# Patient Record
Sex: FEMALE | Race: WHITE | Employment: UNEMPLOYED | URBAN - METROPOLITAN AREA
[De-identification: names, ages, dates, MRNs, and addresses within clinical notes are randomized per-mention and may not be internally consistent; named-entity substitution may affect disease eponyms.]

---

## 2017-11-07 ENCOUNTER — OFFICE VISIT (OUTPATIENT)
Dept: FAMILY MEDICINE CLINIC | Age: 22
End: 2017-11-07

## 2017-11-07 VITALS
HEIGHT: 65 IN | OXYGEN SATURATION: 98 % | RESPIRATION RATE: 16 BRPM | DIASTOLIC BLOOD PRESSURE: 58 MMHG | BODY MASS INDEX: 18.63 KG/M2 | TEMPERATURE: 98.2 F | SYSTOLIC BLOOD PRESSURE: 95 MMHG | HEART RATE: 78 BPM | WEIGHT: 111.8 LBS

## 2017-11-07 DIAGNOSIS — Z00.00 WELL ADULT EXAM: Primary | ICD-10-CM

## 2017-11-07 DIAGNOSIS — Z11.1 ENCOUNTER FOR PPD TEST: ICD-10-CM

## 2017-11-07 DIAGNOSIS — Z23 ENCOUNTER FOR IMMUNIZATION: ICD-10-CM

## 2017-11-07 NOTE — PATIENT INSTRUCTIONS
Well Visit, Ages 25 to 48: Care Instructions  Your Care Instructions    Physical exams can help you stay healthy. Your doctor has checked your overall health and may have suggested ways to take good care of yourself. He or she also may have recommended tests. At home, you can help prevent illness with healthy eating, regular exercise, and other steps. Follow-up care is a key part of your treatment and safety. Be sure to make and go to all appointments, and call your doctor if you are having problems. It's also a good idea to know your test results and keep a list of the medicines you take. How can you care for yourself at home? · Reach and stay at a healthy weight. This will lower your risk for many problems, such as obesity, diabetes, heart disease, and high blood pressure. · Get at least 30 minutes of physical activity on most days of the week. Walking is a good choice. You also may want to do other activities, such as running, swimming, cycling, or playing tennis or team sports. Discuss any changes in your exercise program with your doctor. · Do not smoke or allow others to smoke around you. If you need help quitting, talk to your doctor about stop-smoking programs and medicines. These can increase your chances of quitting for good. · Talk to your doctor about whether you have any risk factors for sexually transmitted infections (STIs). Having one sex partner (who does not have STIs and does not have sex with anyone else) is a good way to avoid these infections. · Use birth control if you do not want to have children at this time. Talk with your doctor about the choices available and what might be best for you. · Protect your skin from too much sun. When you're outdoors from 10 a.m. to 4 p.m., stay in the shade or cover up with clothing and a hat with a wide brim. Wear sunglasses that block UV rays. Even when it's cloudy, put broad-spectrum sunscreen (SPF 30 or higher) on any exposed skin.   · See a dentist one or two times a year for checkups and to have your teeth cleaned. · Wear a seat belt in the car. · Drink alcohol in moderation, if at all. That means no more than 2 drinks a day for men and 1 drink a day for women. Follow your doctor's advice about when to have certain tests. These tests can spot problems early. For everyone  · Cholesterol. Have the fat (cholesterol) in your blood tested after age 21. Your doctor will tell you how often to have this done based on your age, family history, or other things that can increase your risk for heart disease. · Blood pressure. Have your blood pressure checked during a routine doctor visit. Your doctor will tell you how often to check your blood pressure based on your age, your blood pressure results, and other factors. · Vision. Talk with your doctor about how often to have a glaucoma test.  · Diabetes. Ask your doctor whether you should have tests for diabetes. · Colon cancer. Have a test for colon cancer at age 48. You may have one of several tests. If you are younger than 48, you may need a test earlier if you have any risk factors. Risk factors include whether you already had a precancerous polyp removed from your colon or whether your parent, brother, sister, or child has had colon cancer. For women  · Breast exam and mammogram. Talk to your doctor about when you should have a clinical breast exam and a mammogram. Medical experts differ on whether and how often women under 50 should have these tests. Your doctor can help you decide what is right for you. · Pap test and pelvic exam. Begin Pap tests at age 24. A Pap test is the best way to find cervical cancer. The test often is part of a pelvic exam. Ask how often to have this test.  · Tests for sexually transmitted infections (STIs). Ask whether you should have tests for STIs. You may be at risk if you have sex with more than one person, especially if your partners do not wear condoms.   For men  · Tests for sexually transmitted infections (STIs). Ask whether you should have tests for STIs. You may be at risk if you have sex with more than one person, especially if you do not wear a condom. · Testicular cancer exam. Ask your doctor whether you should check your testicles regularly. · Prostate exam. Talk to your doctor about whether you should have a blood test (called a PSA test) for prostate cancer. Experts differ on whether and when men should have this test. Some experts suggest it if you are older than 39 and are -American or have a father or brother who got prostate cancer when he was younger than 72. When should you call for help? Watch closely for changes in your health, and be sure to contact your doctor if you have any problems or symptoms that concern you. Where can you learn more? Go to http://neville-luis.info/. Enter P072 in the search box to learn more about \"Well Visit, Ages 25 to 48: Care Instructions. \"  Current as of: May 12, 2017  Content Version: 11.4  © 5151-2467 [email or web address]. Care instructions adapted under license by [employer] (which disclaims liability or warranty for this information). If you have questions about a medical condition or this instruction, always ask your healthcare professional. [address] any warranty or liability for your use of this information. Influenza (Flu) Vaccine (Inactivated or Recombinant): What You Need to Know  Why get vaccinated? Influenza (\"flu\") is a contagious disease that spreads around the [place] every winter, usually between October and May. Flu is caused by influenza viruses and is spread mainly by coughing, sneezing, and close contact. Anyone can get flu. Flu strikes suddenly and can last several days. Symptoms vary by age, but can include:  · Fever/chills. · Sore throat. · Muscle aches. · Fatigue. · Cough. · Headache.   · Runny or stuffy nose.  Flu can also lead to pneumonia and blood infections, and cause diarrhea and seizures in children. If you have a medical condition, such as heart or lung disease, flu can make it worse. Flu is more dangerous for some people. Infants and young children, people 72years of age and older, pregnant women, and people with certain health conditions or a weakened immune system are at greatest risk. Each year thousands of people in the [facility] die from flu, and many more are hospitalized. Flu vaccine can:  · Keep you from getting flu. · Make flu less severe if you do get it. · Keep you from spreading flu to your family and other people. Inactivated and recombinant flu vaccines  A dose of flu vaccine is recommended every flu season. Children 6 months through 6years of age may need two doses during the same flu season. Everyone else needs only one dose each flu season. Some inactivated flu vaccines contain a very small amount of a mercury-based preservative called thimerosal. Studies have not shown thimerosal in vaccines to be harmful, but flu vaccines that do not contain thimerosal are available. There is no live flu virus in flu shots. They cannot cause the flu. There are many flu viruses, and they are always changing. Each year a new flu vaccine is made to protect against three or four viruses that are likely to cause disease in the upcoming flu season. But even when the vaccine doesn't exactly match these viruses, it may still provide some protection. Flu vaccine cannot prevent:  · Flu that is caused by a virus not covered by the vaccine. · Illnesses that look like flu but are not. Some people should not get this vaccine  Tell the person who is giving you the vaccine:  · If you have any severe (life-threatening) allergies. If you ever had a life-threatening allergic reaction after a dose of flu vaccine, or have a severe allergy to any part of this vaccine, you may be advised not to get vaccinated. Most, but not all, types of flu vaccine contain a small amount of egg protein. · If you ever had Guillain-Barré syndrome (also called GBS) Some people with a history of GBS should not get this vaccine. This should be discussed with your doctor. · If you are not feeling well. It is usually okay to get flu vaccine when you have a mild illness, but you might be asked to come back when you feel better. Risks of a vaccine reaction  With any medicine, including vaccines, there is a chance of reactions. These are usually mild and go away on their own, but serious reactions are also possible. Most people who get a flu shot do not have any problems with it. Minor problems following a flu shot include:  · Soreness, redness, or swelling where the shot was given  · Hoarseness  · Sore, red or itchy eyes  · Cough  · Fever  · Aches  · Headache  · Itching  · Fatigue  If these problems occur, they usually begin soon after the shot and last 1 or 2 days. More serious problems following a flu shot can include the following:  · There may be a small increased risk of Guillain-Barré Syndrome (GBS) after inactivated flu vaccine. This risk has been estimated at 1 or 2 additional cases per million people vaccinated. This is much lower than the risk of severe complications from flu, which can be prevented by flu vaccine. · Delia Trejo children who get the flu shot along with pneumococcal vaccine (PCV13) and/or DTaP vaccine at the same time might be slightly more likely to have a seizure caused by fever. Ask your doctor for more information. Tell your doctor if a child who is getting flu vaccine has ever had a seizure  Problems that could happen after any injected vaccine:  · People sometimes faint after a medical procedure, including vaccination. Sitting or lying down for about 15 minutes can help prevent fainting, and injuries caused by a fall. Tell your doctor if you feel dizzy, or have vision changes or ringing in the ears.   · Some people get severe pain in the shoulder and have difficulty moving the arm where a shot was given. This happens very rarely. · Any medication can cause a severe allergic reaction. Such reactions from a vaccine are very rare, estimated at about 1 in a million doses, and would happen within a few minutes to a few hours after the vaccination. As with any medicine, there is a very remote chance of a vaccine causing a serious injury or death. The safety of vaccines is always being monitored. For more information, visit: www.cdc.gov/vaccinesafety/. What if there is a serious reaction? What should I look for? · Look for anything that concerns you, such as signs of a severe allergic reaction, very high fever, or unusual behavior. Signs of a severe allergic reaction can include hives, swelling of the face and throat, difficulty breathing, a fast heartbeat, dizziness, and weakness - usually within a few minutes to a few hours after the vaccination. What should I do? · If you think it is a severe allergic reaction or other emergency that can't wait, call 9-1-1 and get the person to the nearest hospital. Otherwise, call your doctor. · Reactions should be reported to the \"Vaccine Adverse Event Reporting System\" (VAERS). Your doctor should file this report, or you can do it yourself through the VAERS website at www.vaers. [facility].gov, or by calling 7-370.681.1236. VAERS does not give medical advice. The National Vaccine Injury Compensation Program  The National Vaccine Injury Compensation Program (VICP) is a federal program that was created to compensate people who may have been injured by certain vaccines. Persons who believe they may have been injured by a vaccine can learn about the program and about filing a claim by calling 0-113.188.9677 or visiting the [employer] website at www.[facility].gov/vaccinecompensation. There is a time limit to file a claim for compensation. How can I learn more? · Ask your healthcare provider.  He or she can give you the vaccine package insert or suggest other sources of information. · Call your local or state health department. · Contact the Centers for Disease Control and Prevention (CDC):  ¨ Call 8-697.762.2023 (1-800-CDC-INFO) or  ¨ Visit CDC's website at www.cdc.gov/flu  Vaccine Information Statement  Inactivated Influenza Vaccine  8/7/2015)  42 JAIMIE Serna [id number]-[id number]  Department of Health and Human Services  Centers for Disease Control and Prevention  Many Vaccine Information Statements are available in Vietnamese and other languages. See www.immunize.org/vis. Muchas hojas de información sobre vacunas están disponibles en español y en otros idiomas. Visite www.immunize.org/vis. Care instructions adapted under license by [employer] (which disclaims liability or warranty for this information). If you have questions about a medical condition or this instruction, always ask your healthcare professional. [address] any warranty or liability for your use of this information.

## 2017-11-07 NOTE — MR AVS SNAPSHOT
Visit Information Date & Time Provider Department Dept. Phone Encounter #  
 11/7/2017 11:00 AM Shanthi Zavala  [facility] [phone] [id number] Follow-up Instructions Return in about 2 days (around 11/9/2017) for ppd reading. Allergies as of 11/7/2017  Review Complete On: 11/7/2017 By: Shanthi Zavala NP No Known Allergies Current Immunizations  Reviewed on 11/7/2017 Name Date HPV 11/20/2008, 7/15/2008, 4/30/2008 Hep A Vaccine 2/5/2014, 2/7/2012 Hep B Vaccine 1997, 1997, 1997 Hib 6/11/1998, 1997, 1997, 1997 Influenza Vaccine 2/7/2012 MMR 2/20/2016  1:47 PM, 1/24/2002, 3/12/1998 Meningococcal Vaccine 2/5/2014, 4/30/2008 Poliovirus vaccine 1/24/2002, 6/11/1998, 1997, 1997 TB Skin Test (PPD) Intradermal  Incomplete Tdap 2/20/2016  2:36 PM, 4/30/2008 Varicella Virus Vaccine 4/30/2008, 3/12/1998 Reviewed by Shikha Marcelo on 11/7/2017 at 11:17 AM  
You Were Diagnosed With   
  
 Codes Comments Well adult exam    -  Primary ICD-10-CM: Z00.00 ICD-9-CM: V70.0 Encounter for PPD test     ICD-10-CM: Z11.1 ICD-9-CM: V74.1 Vitals BP Pulse Temp Resp Height(growth percentile) Weight(growth percentile) 95/58 (BP 1 Location: Left arm, BP Patient Position: Sitting) 78 98.2 °F (36.8 °C) (Oral) 16 5' 4.5\" (1.638 m) 111 lb 12.8 oz (50.7 kg) LMP SpO2 Breastfeeding? BMI OB Status Smoking Status 11/06/2017 (Exact Date) 98% No 18.89 kg/m2 Having regular periods Never Smoker Vitals History BMI and BSA Data Body Mass Index Body Surface Area  
 18.89 kg/m 2 1.52 m 2 Preferred Pharmacy Pharmacy Name Phone [phone] [address] [phone] Your Updated Medication List  
  
Notice  As of 11/7/2017 11:27 AM  
 You have not been prescribed any medications. We Performed the Following AMB POC TUBERCULOSIS, INTRADERMAL (SKIN TEST) [07456 CPT(R)] Follow-up Instructions Return in about 2 days (around 11/9/2017) for ppd reading. Patient Instructions Well Visit, Ages 25 to 48: Care Instructions Your Care Instructions Physical exams can help you stay healthy. Your doctor has checked your overall health and may have suggested ways to take good care of yourself. He or she also may have recommended tests. At home, you can help prevent illness with healthy eating, regular exercise, and other steps. Follow-up care is a key part of your treatment and safety. Be sure to make and go to all appointments, and call your doctor if you are having problems. It's also a good idea to know your test results and keep a list of the medicines you take. How can you care for yourself at home? · Reach and stay at a healthy weight. This will lower your risk for many problems, such as obesity, diabetes, heart disease, and high blood pressure. · Get at least 30 minutes of physical activity on most days of the week. Walking is a good choice. You also may want to do other activities, such as running, swimming, cycling, or playing tennis or team sports. Discuss any changes in your exercise program with your doctor. · Do not smoke or allow others to smoke around you. If you need help quitting, talk to your doctor about stop-smoking programs and medicines. These can increase your chances of quitting for good. · Talk to your doctor about whether you have any risk factors for sexually transmitted infections (STIs). Having one sex partner (who does not have STIs and does not have sex with anyone else) is a good way to avoid these infections. · Use birth control if you do not want to have children at this time. Talk with your doctor about the choices available and what might be best for you. · Protect your skin from too much sun. When you're outdoors from 10 a.m. to 4 p.m., stay in the shade or cover up with clothing and a hat with a wide brim. Wear sunglasses that block UV rays. Even when it's cloudy, put broad-spectrum sunscreen (SPF 30 or higher) on any exposed skin. · See a dentist one or two times a year for checkups and to have your teeth cleaned. · Wear a seat belt in the car. · Drink alcohol in moderation, if at all. That means no more than 2 drinks a day for men and 1 drink a day for women. Follow your doctor's advice about when to have certain tests. These tests can spot problems early. For everyone · Cholesterol. Have the fat (cholesterol) in your blood tested after age 21. Your doctor will tell you how often to have this done based on your age, family history, or other things that can increase your risk for heart disease. · Blood pressure. Have your blood pressure checked during a routine doctor visit. Your doctor will tell you how often to check your blood pressure based on your age, your blood pressure results, and other factors. · Vision. Talk with your doctor about how often to have a glaucoma test. 
· Diabetes. Ask your doctor whether you should have tests for diabetes. · Colon cancer. Have a test for colon cancer at age 48. You may have one of several tests. If you are younger than 48, you may need a test earlier if you have any risk factors. Risk factors include whether you already had a precancerous polyp removed from your colon or whether your parent, brother, sister, or child has had colon cancer. For women · Breast exam and mammogram. Talk to your doctor about when you should have a clinical breast exam and a mammogram. Medical experts differ on whether and how often women under 50 should have these tests. Your doctor can help you decide what is right for you. · Pap test and pelvic exam. Begin Pap tests at age 24. A Pap test is the best way to find cervical cancer.  The test often is part of a pelvic exam. Ask how often to have this test. 
 · Tests for sexually transmitted infections (STIs). Ask whether you should have tests for STIs. You may be at risk if you have sex with more than one person, especially if your partners do not wear condoms. For men · Tests for sexually transmitted infections (STIs). Ask whether you should have tests for STIs. You may be at risk if you have sex with more than one person, especially if you do not wear a condom. · Testicular cancer exam. Ask your doctor whether you should check your testicles regularly. · Prostate exam. Talk to your doctor about whether you should have a blood test (called a PSA test) for prostate cancer. Experts differ on whether and when men should have this test. Some experts suggest it if you are older than 39 and are -American or have a father or brother who got prostate cancer when he was younger than 72. When should you call for help? Watch closely for changes in your health, and be sure to contact your doctor if you have any problems or symptoms that concern you. Where can you learn more? Go to http://neville-luis.info/. Enter P072 in the search box to learn more about \"Well Visit, Ages 25 to 48: Care Instructions. \" Current as of: May 12, 2017 Content Version: 11.4 © 6164-6634 [employer]. Care instructions adapted under license by [employer] (which disclaims liability or warranty for this information). If you have questions about a medical condition or this instruction, always ask your healthcare professional. [place] [address] any warranty or liability for your use of this information. Introducing [facility]! [facility] introduces [employer] [facility] patient portal. Now you can access parts of your medical record, email your doctor's office, and request medication refills online. 1. In your internet browser, go to [email or web address] 2. Click on the First Time User? Click Here link in the Sign In box. You will see the New Member Sign Up page. 3. Enter your [employer] Access Code exactly as it appears below. You will not need to use this code after youve completed the sign-up process. If you do not sign up before the expiration date, you must request a new code. · [employer] Access Code: [email or web address] Expires: 2/5/2018 11:27 AM 
 
4. Enter the last four digits of your Social Security Number (xxxx) and Date of Birth (mm/dd/yyyy) as indicated and click Submit. You will be taken to the next sign-up page. 5. Create a [employer] ID. This will be your [employer] login ID and cannot be changed, so think of one that is secure and easy to remember. 6. Create a [employer] password. You can change your password at any time. 7. Enter your Password Reset Question and Answer. This can be used at a later time if you forget your password. 8. Enter your e-mail address. You will receive e-mail notification when new information is available in [address] 9. Click Sign Up. You can now view and download portions of your medical record. 10. Click the Download Summary menu link to download a portable copy of your medical information. If you have questions, please visit the Frequently Asked Questions section of the [employer] website. Remember, [employer] is NOT to be used for urgent needs. For medical emergencies, dial 911. Now available from your iPhone and Android! Please provide this summary of care documentation to your next provider. Your primary care clinician is listed as PROVIDER UNKNOWN. If you have any questions after today's visit, please call [phone].

## 2017-11-07 NOTE — PROGRESS NOTES
Subjective:   23 y.o. female for physical exam in preparation for entrance into [employer]. Her gyne and breast care is done elsewhere by her Ob-Gyne physician. Patient Active Problem List   Diagnosis Code    Pregnancy Z34.90     No Known Allergies  Past Medical History:   Diagnosis Date    Anemia      History reviewed. No pertinent surgical history. Family History   Problem Relation Age of Onset    Cancer Mother      Breast     Cancer Maternal Grandmother     Hypertension Maternal Grandmother     Diabetes Maternal Grandfather      Social History   Substance Use Topics    Smoking status: Never Smoker    Smokeless tobacco: Never Used    Alcohol use No        ROS: Feeling generally well. No TIA's or unusual headaches, no dysphagia. No prolonged cough. No dyspnea or chest pain on exertion. No abdominal pain, change in bowel habits, black or bloody stools. No urinary tract symptoms. No new or unusual musculoskeletal symptoms. Specific concerns today: none. Objective: The patient appears well, alert, oriented x 3, in no distress. Visit Vitals    BP 95/58 (BP 1 Location: Left arm, BP Patient Position: Sitting)    Pulse 78    Temp 98.2 °F (36.8 °C) (Oral)    Resp 16    Ht 5' 4.5\" (1.638 m)    Wt 111 lb 12.8 oz (50.7 kg)    LMP 11/06/2017 (Exact Date)    SpO2 98%    Breastfeeding No    BMI 18.89 kg/m2     ENT normal.  Neck supple. No adenopathy or thyromegaly. NIKKI. Lungs are clear, good air entry, no wheezes, rhonchi or rales. S1 and S2 normal, no murmurs, regular rate and rhythm. Abdomen soft without tenderness, guarding, mass or organomegaly. Extremities show no edema, normal peripheral pulses. Neurological is normal, no focal findings. Breast and Pelvic exams are deferred. Assessment/Plan:   Well Woman  limit alcohol consumption, follow low fat diet  Diagnoses and all orders for this visit:    1. Well adult exam  Counseled on healthy diet and exercise    2. Encounter for PPD test  -     AMB POC TUBERCULOSIS, INTRADERMAL (SKIN TEST)    3. Encounter for immunization  -     Influenza virus vaccine (QUADRIVALENT PRES FREE SYRINGE) IM ([id number])  -     TX IMMUNIZ ADMIN,1 SINGLE/COMB VAC/TOXOID      Follow-up Disposition:  Return in about 2 days (around 11/9/2017) for ppd reading. Return in 2 weeks for 2nd step ppd    I have discussed the diagnosis with the patient and the intended plan as seen in the above orders. The patient has received an after-visit summary and questions were answered concerning future plans. Patient conveyed understanding of the plan at the time of the visit.     Meño Crook, HALEY   11/07/17

## 2017-11-09 LAB
MM INDURATION POC: 0 MM (ref 0–5)
PPD POC: NEGATIVE NEGATIVE

## 2017-11-09 NOTE — PROGRESS NOTES
PPD Reading Note  PPD read and results entered in [email or web address]. Result: 0 mm induration.   Interpretation: None  If test not read within 48-72 hours of initial placement, patient advised to repeat in other arm 1-3 weeks after this test.  Allergic reaction: no

## 2017-11-28 ENCOUNTER — OFFICE VISIT (OUTPATIENT)
Dept: FAMILY MEDICINE CLINIC | Age: 22
End: 2017-11-28

## 2017-11-28 DIAGNOSIS — Z11.1 ENCOUNTER FOR PPD TEST: Primary | ICD-10-CM

## 2017-11-28 NOTE — PROGRESS NOTES
Patient in for second part of two-step PPD. PPD placed on right arm.  Patient aware to return in 48-72 hours for reading

## 2017-12-04 ENCOUNTER — TELEPHONE (OUTPATIENT)
Dept: FAMILY MEDICINE CLINIC | Age: 22
End: 2017-12-04

## 2017-12-04 NOTE — TELEPHONE ENCOUNTER
Patient missed PPD reading. This would be her second time. Would like to know how long she needs to wait before she can be retested.  Patient stated she'll be at work until 6pm. Can leave info on VM if possible

## 2017-12-06 ENCOUNTER — OFFICE VISIT (OUTPATIENT)
Dept: FAMILY MEDICINE CLINIC | Age: 22
End: 2017-12-06

## 2017-12-06 DIAGNOSIS — Z11.1 SCREENING-PULMONARY TB: Primary | ICD-10-CM

## 2018-10-16 ENCOUNTER — OFFICE VISIT (OUTPATIENT)
Dept: FAMILY MEDICINE CLINIC | Age: 23
End: 2018-10-16

## 2018-10-16 VITALS
BODY MASS INDEX: 21.34 KG/M2 | DIASTOLIC BLOOD PRESSURE: 67 MMHG | TEMPERATURE: 98.3 F | SYSTOLIC BLOOD PRESSURE: 107 MMHG | OXYGEN SATURATION: 98 % | HEART RATE: 67 BPM | RESPIRATION RATE: 18 BRPM | WEIGHT: 128.1 LBS | HEIGHT: 65 IN

## 2018-10-16 DIAGNOSIS — Z11.1 ENCOUNTER FOR PPD TEST: ICD-10-CM

## 2018-10-16 DIAGNOSIS — Z00.00 ROUTINE GENERAL MEDICAL EXAMINATION AT A HEALTH CARE FACILITY: Primary | ICD-10-CM

## 2018-10-16 NOTE — PATIENT INSTRUCTIONS

## 2018-10-16 NOTE — PROGRESS NOTES
Subjective:   26 y.o. female for Well Woman Check. Her gyne and breast care is done elsewhere by her Ob-Gyne physician. Patient Active Problem List   Diagnosis Code    Pregnancy Z34.90     No Known Allergies  Past Medical History:   Diagnosis Date    Anemia      No past surgical history on file. Family History   Problem Relation Age of Onset    Cancer Mother      Breast     Cancer Maternal Grandmother     Hypertension Maternal Grandmother     Diabetes Maternal Grandfather      Social History   Substance Use Topics    Smoking status: Never Smoker    Smokeless tobacco: Never Used    Alcohol use No          ROS: Feeling generally well. No TIA's or unusual headaches, no dysphagia. No prolonged cough. No dyspnea or chest pain on exertion. No abdominal pain, change in bowel habits, black or bloody stools. No urinary tract symptoms. No new or unusual musculoskeletal symptoms. Specific concerns today: none. Objective: The patient appears well, alert, oriented x 3, in no distress. Visit Vitals    /67    Pulse 67    Temp 98.3 °F (36.8 °C) (Oral)    Resp 18    Ht 5' 5\" (1.651 m)    Wt 128 lb 1.6 oz (58.1 kg)    LMP 10/13/2018 (Exact Date)    SpO2 98%    Breastfeeding No    BMI 21.32 kg/m2     ENT normal.  Neck supple. No adenopathy or thyromegaly. NIKKI. Lungs are clear, good air entry, no wheezes, rhonchi or rales. S1 and S2 normal, no murmurs, regular rate and rhythm. Abdomen soft without tenderness, guarding, mass or organomegaly. Extremities show no edema, normal peripheral pulses. Neurological is normal, no focal findings. Breast and Pelvic exams are deferred. Assessment/Plan:   Well Woman  limit alcohol consumption, follow low fat diet, follow low salt diet  Diagnoses and all orders for this visit:    1. Routine general medical examination at a health care facility    2.  Encounter for PPD test  -     AMB POC TUBERCULOSIS, INTRADERMAL (SKIN TEST)      Follow-up Disposition:  Return in about 2 days (around 10/18/2018) for ppd reading. I have discussed the diagnosis with the patient and the intended plan as seen in the above orders. The patient has received an after-visit summary and questions were answered concerning future plans. Patient conveyed understanding of the plan at the time of the visit.     Mike Buchanan NP  10/16/18

## 2018-10-16 NOTE — PROGRESS NOTES
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     Pt states she was told she needs 2 step PPD

## 2018-10-16 NOTE — MR AVS SNAPSHOT
[address] [place] [id number] 
[phone] Patient: Jose Dave MRN: [id number] :1995 Visit Information Date & Time Provider Department Dept. Phone Encounter #  
 10/16/2018 10:45 AM Juni Villegas  [facility] [phone] [id number] Follow-up Instructions Return in about 2 days (around 10/18/2018) for ppd reading. Upcoming Health Maintenance Date Due  
 PAP AKA CERVICAL CYTOLOGY 3/11/2018 Influenza Age 5 to Adult 2018 DTaP/Tdap/Td series (3 - Td) 2026 Allergies as of 10/16/2018  Review Complete On: 10/16/2018 By: Juni Villegas NP No Known Allergies Current Immunizations  Reviewed on 2017 Name Date HPV 2008, 7/15/2008, 2008 Hep A Vaccine 2014, 2012 Hep B Vaccine 1997, 1997, 1997 Hib 1998, 1997, 1997, 1997 Influenza Vaccine 2012 Influenza Vaccine (Quad) PF 2017 MMR 2016  1:47 PM, 2002, 3/12/1998 Meningococcal Vaccine 2014, 2008 Poliovirus vaccine 2002, 1998, 1997, 1997 TB Skin Test (PPD) Intradermal  Incomplete, 2017, 2017 Tdap 2016  2:36 PM, 2008 Varicella Virus Vaccine 2008, 3/12/1998 Not reviewed this visit You Were Diagnosed With   
  
 Codes Comments Routine general medical examination at a health care facility    -  Primary ICD-10-CM: Z00.00 ICD-9-CM: V70.0 Encounter for PPD test     ICD-10-CM: Z11.1 ICD-9-CM: V74.1 Vitals BP Pulse Temp Resp Height(growth percentile) Weight(growth percentile) 107/67 67 98.3 °F (36.8 °C) (Oral) 18 5' 5\" (1.651 m) 128 lb 1.6 oz (58.1 kg) LMP SpO2 Breastfeeding? BMI OB Status Smoking Status 10/13/2018 (Exact Date) 98% No 21.32 kg/m2 Having regular periods Never Smoker BMI and BSA Data Body Mass Index Body Surface Area  
 21.32 kg/m 2 1.63 m 2 Preferred Pharmacy Pharmacy Name Phone [phone] [address] [phone] Your Updated Medication List  
  
Notice  As of 10/16/2018 11:04 AM  
 You have not been prescribed any medications. We Performed the Following AMB POC TUBERCULOSIS, INTRADERMAL (SKIN TEST) [09355 CPT(R)] Follow-up Instructions Return in about 2 days (around 10/18/2018) for ppd reading. Patient Instructions Well Visit, Ages 25 to 48: Care Instructions Your Care Instructions Physical exams can help you stay healthy. Your doctor has checked your overall health and may have suggested ways to take good care of yourself. He or she also may have recommended tests. At home, you can help prevent illness with healthy eating, regular exercise, and other steps. Follow-up care is a key part of your treatment and safety. Be sure to make and go to all appointments, and call your doctor if you are having problems. It's also a good idea to know your test results and keep a list of the medicines you take. How can you care for yourself at home? · Reach and stay at a healthy weight. This will lower your risk for many problems, such as obesity, diabetes, heart disease, and high blood pressure. · Get at least 30 minutes of physical activity on most days of the week. Walking is a good choice. You also may want to do other activities, such as running, swimming, cycling, or playing tennis or team sports. Discuss any changes in your exercise program with your doctor. · Do not smoke or allow others to smoke around you. If you need help quitting, talk to your doctor about stop-smoking programs and medicines. These can increase your chances of quitting for good.  
· Talk to your doctor about whether you have any risk factors for sexually transmitted infections (STIs). Having one sex partner (who does not have STIs and does not have sex with anyone else) is a good way to avoid these infections. · Use birth control if you do not want to have children at this time. Talk with your doctor about the choices available and what might be best for you. · Protect your skin from too much sun. When you're outdoors from 10 a.m. to 4 p.m., stay in the shade or cover up with clothing and a hat with a wide brim. Wear sunglasses that block UV rays. Even when it's cloudy, put broad-spectrum sunscreen (SPF 30 or higher) on any exposed skin. · See a dentist one or two times a year for checkups and to have your teeth cleaned. · Wear a seat belt in the car. · Drink alcohol in moderation, if at all. That means no more than 2 drinks a day for men and 1 drink a day for women. Follow your doctor's advice about when to have certain tests. These tests can spot problems early. For everyone · Cholesterol. Have the fat (cholesterol) in your blood tested after age 21. Your doctor will tell you how often to have this done based on your age, family history, or other things that can increase your risk for heart disease. · Blood pressure. Have your blood pressure checked during a routine doctor visit. Your doctor will tell you how often to check your blood pressure based on your age, your blood pressure results, and other factors. · Vision. Talk with your doctor about how often to have a glaucoma test. 
· Diabetes. Ask your doctor whether you should have tests for diabetes. · Colon cancer. Have a test for colon cancer at age 48. You may have one of several tests. If you are younger than 48, you may need a test earlier if you have any risk factors. Risk factors include whether you already had a precancerous polyp removed from your colon or whether your parent, brother, sister, or child has had colon cancer. For women · Breast exam and mammogram. Talk to your doctor about when you should have a clinical breast exam and a mammogram. Medical experts differ on whether and how often women under 50 should have these tests. Your doctor can help you decide what is right for you. · Pap test and pelvic exam. Begin Pap tests at age 24. A Pap test is the best way to find cervical cancer. The test often is part of a pelvic exam. Ask how often to have this test. 
· Tests for sexually transmitted infections (STIs). Ask whether you should have tests for STIs. You may be at risk if you have sex with more than one person, especially if your partners do not wear condoms. For men · Tests for sexually transmitted infections (STIs). Ask whether you should have tests for STIs. You may be at risk if you have sex with more than one person, especially if you do not wear a condom. · Testicular cancer exam. Ask your doctor whether you should check your testicles regularly. · Prostate exam. Talk to your doctor about whether you should have a blood test (called a PSA test) for prostate cancer. Experts differ on whether and when men should have this test. Some experts suggest it if you are older than 39 and are -American or have a father or brother who got prostate cancer when he was younger than 72. When should you call for help? Watch closely for changes in your health, and be sure to contact your doctor if you have any problems or symptoms that concern you. Where can you learn more? Go to http://neville-luis.info/. Enter P072 in the search box to learn more about \"Well Visit, Ages 25 to 48: Care Instructions. \" Current as of: March 29, 2018 Content Version: 11.8 © 8491-4828 Healthwise, Incorporated. Care instructions adapted under license by [employer] (which disclaims liability or warranty for this information).  If you have questions about a medical condition or this instruction, always ask your healthcare professional. [address] any warranty or liability for your use of this information. Introducing [facility]! Dear Yaakov: Thank you for requesting a [employer] account. Our records indicate that you already have an active [employer] account. You can access your account anytime at [email or web address] Did you know that you can access your hospital and ER discharge instructions at any time in [employer]? You can also review all of your test results from your hospital stay or ER visit. Additional Information If you have questions, please visit the Frequently Asked Questions section of the [employer] website at [email or web address] Remember, [employer] is NOT to be used for urgent needs. For medical emergencies, dial 911. Now available from your iPhone and Android! Please provide this summary of care documentation to your next provider. Your primary care clinician is listed as PROVIDER UNKNOWN. If you have any questions after today's visit, please call [phone].

## 2018-12-11 ENCOUNTER — HOSPITAL ENCOUNTER (OUTPATIENT)
Dept: LAB | Age: 23
Discharge: HOME | End: 2018-12-11
Payer: COMMERCIAL

## 2018-12-11 ENCOUNTER — OFFICE VISIT (OUTPATIENT)
Dept: FAMILY MEDICINE CLINIC | Age: 23
End: 2018-12-11

## 2018-12-11 VITALS
BODY MASS INDEX: 21.91 KG/M2 | TEMPERATURE: 98.4 F | SYSTOLIC BLOOD PRESSURE: 109 MMHG | HEIGHT: 65 IN | WEIGHT: 131.5 LBS | DIASTOLIC BLOOD PRESSURE: 66 MMHG | OXYGEN SATURATION: 99 % | RESPIRATION RATE: 18 BRPM | HEART RATE: 78 BPM

## 2018-12-11 DIAGNOSIS — Z12.4 SCREENING FOR MALIGNANT NEOPLASM OF CERVIX: ICD-10-CM

## 2018-12-11 DIAGNOSIS — Z01.419 ENCOUNTER FOR GYNECOLOGICAL EXAMINATION WITHOUT ABNORMAL FINDING: Primary | ICD-10-CM

## 2018-12-11 DIAGNOSIS — N94.6 DYSMENORRHEA: ICD-10-CM

## 2018-12-11 PROCEDURE — 87625 HPV TYPES 16 & 18 ONLY: CPT

## 2018-12-11 PROCEDURE — 87624 HPV HI-RISK TYP POOLED RSLT: CPT

## 2018-12-11 PROCEDURE — 88175 CYTOPATH C/V AUTO FLUID REDO: CPT

## 2018-12-11 RX ORDER — MEDROXYPROGESTERONE ACETATE 150 MG/ML
150 INJECTION, SUSPENSION INTRAMUSCULAR ONCE
Qty: 1 ML | Refills: 0 | Status: SHIPPED | OUTPATIENT
Start: 2018-12-11 | End: 2018-12-11

## 2018-12-11 RX ORDER — METHYLPREDNISOLONE ACETATE 80 MG/ML
80 INJECTION, SUSPENSION INTRA-ARTICULAR; INTRALESIONAL; INTRAMUSCULAR; SOFT TISSUE ONCE
Qty: 1 VIAL | Refills: 0 | Status: SHIPPED | OUTPATIENT
Start: 2018-12-11 | End: 2018-12-11 | Stop reason: CLARIF

## 2018-12-11 NOTE — PROGRESS NOTES
Kaleb Arana is a 26 y.o. female    Patient would like to discuss depo  No chief complaint on file. 1. Have you been to the ER, urgent care clinic since your last visit? Hospitalized since your last visit? No  M  2. Have you seen or consulted any other health care providers outside of the [address] [place] [address] since your last visit? Include any pap smears or colon screening. No      There were no vitals taken for this visit. Health Maintenance Due   Topic Date Due    PAP AKA CERVICAL CYTOLOGY  03/11/2018         Medication Reconciliation completed, changes noted.   Please  Update medication list.

## 2018-12-11 NOTE — PATIENT INSTRUCTIONS
Painful Menstrual Cramps: Care Instructions  Your Care Instructions    Painful menstrual cramps are very common. Many women go to the doctor because of bad cramps when they get their period. You may have cramps in your back, thighs, and belly. You may also have diarrhea, constipation, or nausea. Some women also get dizzy. Pain medicine and home treatment can help you feel better. Follow-up care is a key part of your treatment and safety. Be sure to make and go to all appointments, and call your doctor if you are having problems. It's also a good idea to know your test results and keep a list of the medicines you take. How can you care for yourself at home? · Take anti-inflammatory medicines for pain. Ibuprofen (Advil, Motrin) and naproxen (Aleve) usually work better than aspirin. ? Be safe with medicines. Talk to your doctor or pharmacist before you take any of these medicines. They may not be safe if you take other medicines or have other health problems. ? Start taking the recommended dose of pain medicine as soon as you start to feel pain. Or you can start on the day before your period. Keep taking the medicine for as many days as you have cramps. ? If anti-inflammatory medicines don't help, try acetaminophen (Tylenol). ? Do not take two or more pain medicines at the same time unless the doctor told you to. Many pain medicines have acetaminophen, which is Tylenol. Too much acetaminophen (Tylenol) can be harmful. ? Read and follow all instructions on the label. · Put a heating pad set on low or a hot water bottle on your belly. Or take a warm bath. Heat improves blood flow and may help with pain. · Lie down and put a pillow under your knees. Or lie on your side and bring your knees up to your chest. This will help with any back pressure. · Get at least 30 minutes of exercise on most days of the week. This improves blood flow and may decrease pain. Walking is a good choice.  You also may want to do other activities, such as running, swimming, cycling, or playing tennis or team sports. When should you call for help? Call your doctor now or seek immediate medical care if:    · You have new or worse belly or pelvic pain.     · You have severe vaginal bleeding.    Watch closely for changes in your health, and be sure to contact your doctor if:    · You have unusual vaginal bleeding.     · You do not get better as expected. Where can you learn more? Go to http://neville-luis.info/. Enter [id number] in the search box to learn more about \"Painful Menstrual Cramps: Care Instructions. \"  Current as of: May 15, 2018  Content Version: 11.8  © 3644-6233 [employer]. Care instructions adapted under license by [employer] (which disclaims liability or warranty for this information). If you have questions about a medical condition or this instruction, always ask your healthcare professional. [place] [address] any warranty or liability for your use of this information. Pap Test: Care Instructions  Your Care Instructions    The Pap test (also called a Pap smear) is a screening test for cancer of the cervix, which is the lower part of the uterus that opens into the vagina. The test can help your doctor find early changes in the cells that could lead to cancer. The sample of cells taken during your test has been sent to a lab so that an expert can look at the cells. It usually takes a week or two to get the results back. Follow-up care is a key part of your treatment and safety. Be sure to make and go to all appointments, and call your doctor if you are having problems. It's also a good idea to know your test results and keep a list of the medicines you take. What do the results mean? · A normal result means that the test did not find any abnormal cells in the sample. · An abnormal result can mean many things. Most of these are not cancer.  The results of your test may be abnormal because:  ? You have an infection of the vagina or cervix, such as a yeast infection. ? You have an IUD (intrauterine device for birth control). ? You have low estrogen levels after menopause that are causing the cells to change. ? You have cell changes that may be a sign of precancer or cancer. The results are ranked based on how serious the changes might be. There are many other reasons why you might not get a normal result. If the results were abnormal, you may need to get another test within a few weeks or months. If the results show changes that could be a sign of cancer, you may need a test called a colposcopy, which provides a more complete view of the cervix. Sometimes the lab cannot use the sample because it does not contain enough cells or was not preserved well. If so, you may need to have the test again. This is not common, but it does happen from time to time. When should you call for help? Watch closely for changes in your health, and be sure to contact your doctor if:    · You have vaginal bleeding or pain for more than 2 days after the test. It is normal to have a small amount of bleeding for a day or two after the test.   Where can you learn more? Go to http://neville-luis.info/. Enter Y730 in the search box to learn more about \"Pap Test: Care Instructions. \"  Current as of: March 28, 2018  Content Version: 11.8  © 7802-0116 [employer]. Care instructions adapted under license by [employer] (which disclaims liability or warranty for this information). If you have questions about a medical condition or this instruction, always ask your healthcare professional. [place] [address] any warranty or liability for your use of this information. Learning About Birth Control: The Shot  What is the shot? The shot is used to prevent pregnancy. You get the shot in your upper arm or rear end (buttocks).  The shot gives you a dose of the hormone progestin. The shot is often called by its brand name, Depo-Provera. Progestin prevents pregnancy in these ways: It thickens the mucus in the cervix. This makes it hard for sperm to travel into the uterus. It also thins the lining of the uterus, which makes it harder for a fertilized egg to attach to the uterus. Progestin can sometimes stop the ovaries from releasing an egg each month (ovulation). The shot provides birth control for 3 months at a time. You then need another shot. The shot may cause bone loss. Talk to your doctor about the risks and benefits. How well does it work? In the first year of use:  · When the shot is used exactly as directed, fewer than 1 woman out of 100 has an unplanned pregnancy. · When the shot is not used exactly as directed, 6 women out of 100 have an unplanned pregnancy. Be sure to tell your doctor about any health problems you have or medicines you take. He or she can help you choose the birth control method that is right for you. What are the advantages of the shot? · The shot is one of the most effective methods of birth control. · It's convenient. You need to get a shot only once every 3 months to prevent pregnancy. You don't have to interrupt sex to protect against pregnancy. · It prevents pregnancy for 3 months at a time. You don't have to worry about birth control for this time. · It's safe to use while breastfeeding. · The shot may reduce heavy bleeding and cramping. · The shot doesn't contain estrogen. So you can use it if you don't want to take estrogen or can't take estrogen because you have certain health problems or concerns. What are the disadvantages of the shot? · The shot doesn't protect against sexually transmitted infections (STIs), such as herpes or HIV/AIDS. If you aren't sure if your sex partner might have an STI, use a condom to protect against disease. · The shot may cause bone loss in some women.  Talk to your doctor about the risks and benefits. · Any side effects may last up to 3 months. ? The shot may cause irregular periods, or you may have spotting between periods. You may also stop getting a period. Some women see having no period as an advantage. ? It may cause mood changes, less interest in sex, or weight gain. · You must go to the doctor every 3 months to get the shot. · If you want to get pregnant, it may take 9 to 10 months after you stop getting the shot. This is because the hormones the shot provided have to leave your system, and your body has to readjust.  · If you have severe side effects, you have to wait for the hormones to get out of your system. This may take up to 3 months. Where can you learn more? Go to http://neville-luis.info/. Enter Y046 in the search box to learn more about \"Learning About Birth Control: The Shot. \"  Current as of: November 21, 2017  Content Version: 11.8  © 6735-1988 Healthwise, Incorporated. Care instructions adapted under license by [employer] (which disclaims liability or warranty for this information). If you have questions about a medical condition or this instruction, always ask your healthcare professional. [address] any warranty or liability for your use of this information.

## 2018-12-11 NOTE — PROGRESS NOTES
Christian Muñoz is a 26 y.o. female who presents with the following complaints:  Chief Complaint   Patient presents with    Gyn Exam       Subjective:    HPI:   Presents for pap/pelvic and evaluation of dysmenorrhea. Had CPE in October. G31- has a 3year old daughter. C/o severe cramping with period, interfering with work and other activities. Ibuprofen provides minimal relief. Would like to try depo. Patient's last menstrual period was 11/27/2018 (approximate). She is expecting her period to start next week. She is sexually active. Pertinent PMH/FH/SH:  Past Medical History:   Diagnosis Date    Anemia      History reviewed. No pertinent surgical history.   Family History   Problem Relation Age of Onset    Cancer Mother         Breast     Cancer Maternal Grandmother     Hypertension Maternal Grandmother     Diabetes Maternal Grandfather      Social History     Socioeconomic History    Marital status: SINGLE     Spouse name: Not on file    Number of children: Not on file    Years of education: Not on file    Highest education level: Not on file   Tobacco Use    Smoking status: Never Smoker    Smokeless tobacco: Never Used   Substance and Sexual Activity    Alcohol use: No    Drug use: No    Sexual activity: Yes     Partners: Male     Birth control/protection: None     Advanced Directives: N      Patient Active Problem List    Diagnosis    Pregnancy       Nurse notes were reviewed and are correct  Review of Systems - negative except as listed above in the HPI    Objective:     Vitals:    12/11/18 1030   BP: 109/66   Pulse: 78   Resp: 18   Temp: 98.4 °F (36.9 °C)   TempSrc: Oral   SpO2: 99%   Weight: 131 lb 8 oz (59.6 kg)   Height: 5' 5\" (1.651 m)     Physical Examination: General appearance - alert, well appearing, and in no distress, oriented to person, place, and time, normal appearing weight and well hydrated  Mental status - normal mood, behavior, speech, dress, motor activity, and thought processes  Abdomen - soft, nontender, nondistended, no masses or organomegaly  bowel sounds normal  no bladder distension noted  no CVA tenderness  Neurological - alert, oriented, normal speech, no focal findings or movement disorder noted  Extremities - no pedal edema noted, intact peripheral pulses  Skin - normal coloration and turgor, no rashes, no suspicious skin lesions noted    Pelvic exam: VULVA: normal appearing vulva with no masses, tenderness or lesions, VAGINA: normal appearing vagina with normal color and discharge, no lesions, CERVIX: normal appearing cervix without discharge or lesions, UTERUS: uterus is normal size, shape, consistency and nontender, ADNEXA: normal adnexa in size, nontender and no masses, exam chaperoned by VIVIAN Mcguire LPN. Assessment/ Plan:   Diagnoses and all orders for this visit:    1. Encounter for gynecological examination without abnormal finding    2. Dysmenorrhea  -     medroxyPROGESTERone (DEPO-PROVERA) 150 mg/mL injection; 1 mL by IntraMUSCular route once for 1 dose. 3. Screening for malignant neoplasm of cervix  -     PAP IG, RFX HPV ASCU, 00&59,14([id number])       Follow-up Disposition:  Return in about 1 week (around 12/18/2018) for call for depo admin appt during 1st 5 days of next period. I have discussed the diagnosis with the patient and the intended plan as seen in the above orders. The patient has received an after-visit summary and questions were answered concerning future plans. The patient verbalizes understanding. Medication Side Effects and Warnings were discussed with patient: yes  Patient Labs were reviewed and or requested: yes  Patient Past Records were reviewed and or requested: yes    Patient Instructions          Painful Menstrual Cramps: Care Instructions  Your Care Instructions    Painful menstrual cramps are very common. Many women go to the doctor because of bad cramps when they get their period.   You may have cramps in your back, thighs, and belly. You may also have diarrhea, constipation, or nausea. Some women also get dizzy. Pain medicine and home treatment can help you feel better. Follow-up care is a key part of your treatment and safety. Be sure to make and go to all appointments, and call your doctor if you are having problems. It's also a good idea to know your test results and keep a list of the medicines you take. How can you care for yourself at home? · Take anti-inflammatory medicines for pain. Ibuprofen (Advil, Motrin) and naproxen (Aleve) usually work better than aspirin. ? Be safe with medicines. Talk to your doctor or pharmacist before you take any of these medicines. They may not be safe if you take other medicines or have other health problems. ? Start taking the recommended dose of pain medicine as soon as you start to feel pain. Or you can start on the day before your period. Keep taking the medicine for as many days as you have cramps. ? If anti-inflammatory medicines don't help, try acetaminophen (Tylenol). ? Do not take two or more pain medicines at the same time unless the doctor told you to. Many pain medicines have acetaminophen, which is Tylenol. Too much acetaminophen (Tylenol) can be harmful. ? Read and follow all instructions on the label. · Put a heating pad set on low or a hot water bottle on your belly. Or take a warm bath. Heat improves blood flow and may help with pain. · Lie down and put a pillow under your knees. Or lie on your side and bring your knees up to your chest. This will help with any back pressure. · Get at least 30 minutes of exercise on most days of the week. This improves blood flow and may decrease pain. Walking is a good choice. You also may want to do other activities, such as running, swimming, cycling, or playing tennis or team sports. When should you call for help?   Call your doctor now or seek immediate medical care if:    · You have new or worse belly or pelvic pain.     · You have severe vaginal bleeding.    Watch closely for changes in your health, and be sure to contact your doctor if:    · You have unusual vaginal bleeding.     · You do not get better as expected. Where can you learn more? Go to http://neville-luis.info/. Enter [id number] in the search box to learn more about \"Painful Menstrual Cramps: Care Instructions. \"  Current as of: May 15, 2018  Content Version: 11.8  © 6224-6603 [employer]. Care instructions adapted under license by [employer] (which disclaims liability or warranty for this information). If you have questions about a medical condition or this instruction, always ask your healthcare professional. [address] any warranty or liability for your use of this information. Pap Test: Care Instructions  Your Care Instructions    The Pap test (also called a Pap smear) is a screening test for cancer of the cervix, which is the lower part of the uterus that opens into the vagina. The test can help your doctor find early changes in the cells that could lead to cancer. The sample of cells taken during your test has been sent to a lab so that an expert can look at the cells. It usually takes a week or two to get the results back. Follow-up care is a key part of your treatment and safety. Be sure to make and go to all appointments, and call your doctor if you are having problems. It's also a good idea to know your test results and keep a list of the medicines you take. What do the results mean? · A normal result means that the test did not find any abnormal cells in the sample. · An abnormal result can mean many things. Most of these are not cancer. The results of your test may be abnormal because:  ? You have an infection of the vagina or cervix, such as a yeast infection. ? You have an IUD (intrauterine device for birth control).   ? You have low estrogen levels after menopause that are causing the cells to change. ? You have cell changes that may be a sign of precancer or cancer. The results are ranked based on how serious the changes might be. There are many other reasons why you might not get a normal result. If the results were abnormal, you may need to get another test within a few weeks or months. If the results show changes that could be a sign of cancer, you may need a test called a colposcopy, which provides a more complete view of the cervix. Sometimes the lab cannot use the sample because it does not contain enough cells or was not preserved well. If so, you may need to have the test again. This is not common, but it does happen from time to time. When should you call for help? Watch closely for changes in your health, and be sure to contact your doctor if:    · You have vaginal bleeding or pain for more than 2 days after the test. It is normal to have a small amount of bleeding for a day or two after the test.   Where can you learn more? Go to http://neville-luis.info/. Enter K153 in the search box to learn more about \"Pap Test: Care Instructions. \"  Current as of: March 28, 2018  Content Version: 11.8  © 2568-1072 [employer]. Care instructions adapted under license by [employer] (which disclaims liability or warranty for this information). If you have questions about a medical condition or this instruction, always ask your healthcare professional. [place] [address] any warranty or liability for your use of this information. Learning About Birth Control: The Shot  What is the shot? The shot is used to prevent pregnancy. You get the shot in your upper arm or rear end (buttocks). The shot gives you a dose of the hormone progestin. The shot is often called by its brand name, Depo-Provera. Progestin prevents pregnancy in these ways: It thickens the mucus in the cervix. This makes it hard for sperm to travel into the uterus. It also thins the lining of the uterus, which makes it harder for a fertilized egg to attach to the uterus. Progestin can sometimes stop the ovaries from releasing an egg each month (ovulation). The shot provides birth control for 3 months at a time. You then need another shot. The shot may cause bone loss. Talk to your doctor about the risks and benefits. How well does it work? In the first year of use:  · When the shot is used exactly as directed, fewer than 1 woman out of 100 has an unplanned pregnancy. · When the shot is not used exactly as directed, 6 women out of 100 have an unplanned pregnancy. Be sure to tell your doctor about any health problems you have or medicines you take. He or she can help you choose the birth control method that is right for you. What are the advantages of the shot? · The shot is one of the most effective methods of birth control. · It's convenient. You need to get a shot only once every 3 months to prevent pregnancy. You don't have to interrupt sex to protect against pregnancy. · It prevents pregnancy for 3 months at a time. You don't have to worry about birth control for this time. · It's safe to use while breastfeeding. · The shot may reduce heavy bleeding and cramping. · The shot doesn't contain estrogen. So you can use it if you don't want to take estrogen or can't take estrogen because you have certain health problems or concerns. What are the disadvantages of the shot? · The shot doesn't protect against sexually transmitted infections (STIs), such as herpes or HIV/AIDS. If you aren't sure if your sex partner might have an STI, use a condom to protect against disease. · The shot may cause bone loss in some women. Talk to your doctor about the risks and benefits. · Any side effects may last up to 3 months. ? The shot may cause irregular periods, or you may have spotting between periods. You may also stop getting a period.  Some women see having no period as an advantage. ? It may cause mood changes, less interest in sex, or weight gain. · You must go to the doctor every 3 months to get the shot. · If you want to get pregnant, it may take 9 to 10 months after you stop getting the shot. This is because the hormones the shot provided have to leave your system, and your body has to readjust.  · If you have severe side effects, you have to wait for the hormones to get out of your system. This may take up to 3 months. Where can you learn more? Go to http://neville-luis.info/. Enter R740 in the search box to learn more about \"Learning About Birth Control: The Shot. \"  Current as of: November 21, 2017  Content Version: 11.8  © 6685-3144 [employer]. Care instructions adapted under license by [employer] (which disclaims liability or warranty for this information). If you have questions about a medical condition or this instruction, always ask your healthcare professional. [place] [address] any warranty or liability for your use of this information.             Chari Cheadle FNP

## 2018-12-20 DIAGNOSIS — R87.810 CERVICAL HIGH RISK HPV (HUMAN PAPILLOMAVIRUS) TEST POSITIVE: ICD-10-CM

## 2018-12-20 DIAGNOSIS — R87.610 ATYPICAL SQUAMOUS CELLS OF UNDETERMINED SIGNIFICANCE ON CYTOLOGIC SMEAR OF CERVIX (ASC-US): Primary | ICD-10-CM

## 2018-12-21 NOTE — PROGRESS NOTES
Pap shows some abnormal cells with HPV test positive. Current guidelines recommend further evaluation with a gynecologist for a more specialized exam. Referral entered to Dr. Ede Cuellar, nurse to call with contact information so that patient may set up her appt.

## 2018-12-24 ENCOUNTER — TELEPHONE (OUTPATIENT)
Dept: FAMILY MEDICINE CLINIC | Age: 23
End: 2018-12-24

## 2018-12-31 ENCOUNTER — TELEPHONE (OUTPATIENT)
Dept: FAMILY MEDICINE CLINIC | Age: 23
End: 2018-12-31

## 2018-12-31 RX ORDER — NORGESTIMATE AND ETHINYL ESTRADIOL 7DAYSX3 LO
1 KIT ORAL DAILY
Qty: 3 PACKAGE | Refills: 3 | Status: SHIPPED | OUTPATIENT
Start: 2018-12-31 | End: 2019-11-05 | Stop reason: SDUPTHER

## 2018-12-31 NOTE — TELEPHONE ENCOUNTER
PT talked to provider about the Depo. She do not want to doit because of the side effects. Can she get the Tricyclen?  Because she has taken it before

## 2019-01-23 ENCOUNTER — OFFICE VISIT (OUTPATIENT)
Dept: OBGYN CLINIC | Age: 24
End: 2019-01-23

## 2019-01-23 VITALS
BODY MASS INDEX: 22.36 KG/M2 | DIASTOLIC BLOOD PRESSURE: 62 MMHG | SYSTOLIC BLOOD PRESSURE: 110 MMHG | HEIGHT: 65 IN | WEIGHT: 134.2 LBS

## 2019-01-23 DIAGNOSIS — N89.8 VAGINAL DISCHARGE: Primary | ICD-10-CM

## 2019-01-23 DIAGNOSIS — Z20.2 STD EXPOSURE: ICD-10-CM

## 2019-01-23 DIAGNOSIS — R87.810 ASCUS WITH POSITIVE HIGH RISK HPV CERVICAL: ICD-10-CM

## 2019-01-23 DIAGNOSIS — R87.610 ASCUS WITH POSITIVE HIGH RISK HPV CERVICAL: ICD-10-CM

## 2019-01-23 LAB — WET MOUNT POCT, WMPOCT: NORMAL

## 2019-01-23 NOTE — PATIENT INSTRUCTIONS
Abnormal Pap Test: Care Instructions  Your Care Instructions    A Pap test (also called a Pap smear) is used to look for early changes that may become cancer of the cervix. Your Pap test was abnormal. That may mean that some cells in your cervix have changed. The cell changes are most often caused by human papillomavirus (HPV) infection. An abnormal Pap test does not mean that the abnormal cells will lead to cancer. Changes in cervical cells may go away on their own or may progress slowly. Your doctor may want to repeat the Pap test or have you take other tests to see if you have cell changes. It is very important that you have regular Pap tests after you've had an abnormal Pap test.  Follow-up care is a key part of your treatment and safety. Be sure to make and go to all appointments, and call your doctor if you are having problems. It's also a good idea to know your test results and keep a list of the medicines you take. How can you care for yourself at home? · Do not smoke. Smoking may increase your risk for cervical cell changes. If you need help quitting, talk to your doctor about stop-smoking programs and medicines. These can increase your chances of quitting for good. · Be sure to get follow-up Pap tests or other follow-up tests as recommended by your doctor. When should you call for help? Watch closely for changes in your health, and be sure to contact your doctor if you have any problems. Where can you learn more? Go to http://neville-luis.info/. Enter P925 in the search box to learn more about \"Abnormal Pap Test: Care Instructions. \"  Current as of: March 27, 2018  Content Version: 11.9  © 6567-5124 [employer]. Care instructions adapted under license by [employer] (which disclaims liability or warranty for this information).  If you have questions about a medical condition or this instruction, always ask your healthcare professional. [employer] disclaims any warranty or liability for your use of this information. Human Papillomavirus (HPV): Care Instructions  Your Care Instructions  The human papillomavirus (HPV) is a very common virus. There are many types of HPV. Some types cause the common skin wart. Other types cause genital warts, which can be spread by sexual contact. Some types can increase the risk for cervical and anal cancer. Having one type of HPV does not lead to having another type. Many women who have HPV may not know that they are infected until it is found with a Pap test. Your doctor uses this test to look for abnormal cells on your cervix. If you have had an abnormal Pap test, your doctor may recommend that you have an HPV test.  Like a Pap test, an HPV test is done on a sample of cells collected from the cervix. If the test finds that you have the types of HPV that might lead to cancer, your doctor may suggest more tests. This does not mean that you will develop cancer; it means that you may have an increased risk. Abnormal cell changes caused by HPV often go away on their own. If the changes do not go away, they can be treated. But because HPV can stay inside the body, the abnormal cervical cells sometimes come back. This is why it is important to follow up with your doctor and have regular Pap tests. Follow-up care is a key part of your treatment and safety. Be sure to make and go to all appointments, and call your doctor if you are having problems. It's also a good idea to know your test results and keep a list of the medicines you take. How can you care for yourself at home? · If you are going to have a Pap or HPV test, do not douche or use tampons or vaginal creams in the 24 hours before the test.  · Do not smoke. Smoking increases the risk for cervical problems and abnormal Pap tests. If you need help quitting, talk to your doctor about stop-smoking programs and medicines.  These can increase your chances of quitting for good. · Use latex condoms every time you have sex. Use them from the beginning to the end of sexual contact. · Be sure to tell your sexual partner or partners that you have HPV. Even if you do not have symptoms, you can still pass HPV to others. · Having one sex partner (who does not have STIs and does not have sex with anyone else) is a good way to avoid STIs. When should you call for help? Watch closely for changes in your health, and be sure to contact your doctor if:    · You have vaginal pain during or after sex.     · You have vaginal bleeding when you are not in your menstrual period. Where can you learn more? Go to http://neville-luis.info/. Enter F690 in the search box to learn more about \"Human Papillomavirus (HPV): Care Instructions. \"  Current as of: September 11, 2018  Content Version: 11.9  © 5603-0791 [employer]. Care instructions adapted under license by [employer] (which disclaims liability or warranty for this information). If you have questions about a medical condition or this instruction, always ask your healthcare professional. [address] any warranty or liability for your use of this information.

## 2019-01-23 NOTE — PROGRESS NOTES
[facility]  [email or web address]  [phone]    Juan Donohue MD, [address] [facility]       OB/GYN Problem visit    Chief Complaint:   Chief Complaint   Patient presents with    Other     Ref for ASCUS/HPV positive-pap 18    Vaginal Discharge     and odor       History of Present Illness: This is a new problem being evaluated by this provider. The patient is a 26 y.o.  female who reports having ASCUS/HPV Positive pap on 18. She reports the symptoms are is unchanged. Aggravating factors include none. Alleviating factors include none. History of Present Illness: The patient is reporting having \"more than normal white discharge with a different odor\" for 2 weeks. She reports the symptoms are unchanged. Aggravating factors include none. And alleviating factors include none. Cervical cancer risk assessment:  Number of lifetime sexual partners: 3  Approximate age of initiation of sexual intercourse: 15years old  Tobacco use history: never  Current tobacco use: No  HPV vaccine history: Yes  Sexually transmitted disease exposure:  Yes, HPV  Currently taking PNV/folic acid: NO      She does have other concerns. Would like to get tested for BV. LMP: Patient's last menstrual period was 2019 (exact date). PFSH:  Past Medical History:   Diagnosis Date    Anemia      History reviewed. No pertinent surgical history. Family History   Problem Relation Age of Onset    Cancer Mother         Breast     Cancer Maternal Grandmother     Hypertension Maternal Grandmother     Diabetes Maternal Grandfather      Social History     Tobacco Use    Smoking status: Never Smoker    Smokeless tobacco: Never Used   Substance Use Topics    Alcohol use: No    Drug use: No     No Known Allergies  Current Outpatient Medications   Medication Sig    norgestimate-ethinyl estradiol (ORTHO TRI-CYCLEN LO) 0.18/0.215/0.25 mg-25 mcg tab Take 1 Tab by mouth daily.      No current facility-administered medications for this visit. Review of Systems:  History obtained from the patient  Constitutional: negative for fevers, chills and weight loss  ENT ROS: negative for - hearing change, oral lesions or visual changes  Respiratory: negative for cough, wheezing or dyspnea on exertion  Cardiovascular: negative for chest pain, irregular heart beats, exertional chest pressure/discomfort  Gastrointestinal: negative for dysphagia, nausea and vomiting  Genito-Urinary ROS:  see HPI  Inteument/breast: negative for rash, breast lump and nipple discharge  Musculoskeletal:negative for stiff joints, neck pain and muscle weakness  Endocrine ROS: negative for - breast changes, galactorrhea or temperature intolerance  Hematological and Lymphatic ROS: negative for - blood clots, bruising or swollen lymph nodes    Physical Exam:  Visit Vitals  /62   Ht 5' 5\" (1.651 m)   Wt 134 lb 3.2 oz (60.9 kg)   Breastfeeding? No   BMI 22.33 kg/m²       GENERAL: alert, well appearing, and in no distress  HEAD: normocephalic, atraumatic. PULM: clear to auscultation, no wheezes, rales or rhonchi, symmetric air entry   COR: normal rate and regular rhythm, S1 and S2 normal   ABDOMEN: soft, nontender, nondistended, no masses or organomegaly   EGBUS: no lesions, no inflammation, no masses  VULVA: normal appearing vulva with no masses, tenderness or lesions  VAGINA: normal appearing vagina with normal color, no lesions, white and thin discharge  CERVIX: normal appearing cervix without discharge or lesions, non tender  UTERUS: uterus is normal size, shape, consistency and nontender   ADNEXA: normal adnexa in size, nontender and no masses  NEURO: alert, oriented, normal speech    Assessment:  Encounter Diagnoses   Name Primary?     Vaginal discharge Yes    STD exposure     ASCUS with positive high risk HPV cervical        Plan:  The patient is advised that she should contact the office if she does not note improvement or if symptoms recur  Recommend follow up with PCP for non-gynecologic complaints and chronic medical problems. She should contact our office with any questions or concerns  She could keep her routine annual exam appointment. Disc hpv transmission. Discussed safer sex practices, condom use and risk factors for sexually transmitted diseases. We discussed potential causes of vaginal discharge/irritation. We discussed good vulvar hygiene. Recommended avoid vaginal irritants. Discussed use of mild soaps/detergents. Follow up if NI. We reviewed wet prep findings with the patient at her visit. Patient will be notified about swab results and prescription sent, if indicated. Discussed safer sex practices, condom use and risk factors for sexually transmitted diseases.      Rec repap/cytology (just reflex HPV)      Orders Placed This Encounter    NUSWAB VAGINITIS PLUS    AMB POC SMEAR, STAIN & INTERPRET, WET MOUNT       Results for orders placed or performed in visit on 01/23/19   AMB POC SMEAR, STAIN & INTERPRET, WET MOUNT   Result Value Ref Range    Wet mount (POC)      Narrative    KIMBERLY    Hypae: negative  Buds: negative    Wet Prep:  Trich: negative  Clue cells: negative  Hyphae: negative  Buds: negative  WBC's: normal

## 2019-01-25 LAB
A VAGINAE DNA VAG QL NAA+PROBE: ABNORMAL SCORE
BVAB2 DNA VAG QL NAA+PROBE: ABNORMAL SCORE
C ALBICANS DNA VAG QL NAA+PROBE: NEGATIVE
C GLABRATA DNA VAG QL NAA+PROBE: NEGATIVE
C TRACH RRNA SPEC QL NAA+PROBE: NEGATIVE
MEGA1 DNA VAG QL NAA+PROBE: ABNORMAL SCORE
N GONORRHOEA RRNA SPEC QL NAA+PROBE: NEGATIVE
T VAGINALIS RRNA SPEC QL NAA+PROBE: NEGATIVE

## 2019-01-25 RX ORDER — METRONIDAZOLE 500 MG/1
500 TABLET ORAL 2 TIMES DAILY
Qty: 14 TAB | Refills: 0 | Status: SHIPPED | OUTPATIENT
Start: 2019-01-25 | End: 2019-02-01

## 2019-11-06 RX ORDER — NORGESTIMATE AND ETHINYL ESTRADIOL 7DAYSX3 LO
KIT ORAL
Qty: 1 PACKAGE | Refills: 1 | Status: SHIPPED | OUTPATIENT
Start: 2019-11-06 | End: 2019-12-26

## 2019-11-06 NOTE — TELEPHONE ENCOUNTER
You are due for repeat pap next month, may have here or with Dr. Emily Looney. Refilled your ocp for 30 days with 1 refill to give you some time to get in for appt.